# Patient Record
Sex: FEMALE | Race: WHITE | NOT HISPANIC OR LATINO
[De-identification: names, ages, dates, MRNs, and addresses within clinical notes are randomized per-mention and may not be internally consistent; named-entity substitution may affect disease eponyms.]

---

## 2022-02-04 ENCOUNTER — APPOINTMENT (OUTPATIENT)
Dept: COLORECTAL SURGERY | Facility: CLINIC | Age: 47
End: 2022-02-04
Payer: COMMERCIAL

## 2022-02-04 VITALS
HEART RATE: 91 BPM | WEIGHT: 150 LBS | DIASTOLIC BLOOD PRESSURE: 105 MMHG | TEMPERATURE: 97.4 F | BODY MASS INDEX: 25.61 KG/M2 | SYSTOLIC BLOOD PRESSURE: 158 MMHG | HEIGHT: 64 IN | RESPIRATION RATE: 14 BRPM

## 2022-02-04 DIAGNOSIS — Z86.010 PERSONAL HISTORY OF COLONIC POLYPS: ICD-10-CM

## 2022-02-04 DIAGNOSIS — K60.3 ANAL FISTULA: ICD-10-CM

## 2022-02-04 PROCEDURE — 99203 OFFICE O/P NEW LOW 30 MIN: CPT

## 2022-02-04 NOTE — PHYSICAL EXAM
[Respiratory Effort] : normal respiratory effort [Normal Rate and Rhythm] : normal rate and rhythm [No Rash or Lesion] : No rash or lesion [Alert] : alert [Oriented to Person] : oriented to person [Oriented to Place] : oriented to place [Oriented to Time] : oriented to time [Calm] : calm [de-identified] : soft, nondistended [de-identified] : External exam with anterior and posterior sentinel tags.  Fissure 3mm in size noted to anterior midline.  Tenderness with palpation using a cotton-tipped applicator, elicited at anterior and posterior midline, worse anteriorly.  No erythema, fluctuance to indicate abscess, no fistulas noted, no obvious changes concerning for Crohn's.  JOHN and anoscopy deferred secondary to presence of fissure and patient discomfort. [de-identified] : No acute distress [de-identified] : Normocephalic, atraumatic

## 2022-02-04 NOTE — HISTORY OF PRESENT ILLNESS
[FreeTextEntry1] : 46yoF with history of UC diagnosed ~20 years ago, presenting with a chief complaint of perianal pain and lump x2 weeks.  She denies bleeding, fever, chills.  Discomfort is not particularly worsened by bowel movements.  No N/V.  She is applying camphor ointment and alternating warm baths and ice to the area to alleviate discomfort.\par \par She has a somewhat irregular bowel habit that ranges from hard BMs to soft, but she typically has a BM once daily.  No bowel regimen.\par \par She denies and personal or family history of colon cancer.  She does have a brother who also has UC.  She is not currently taking any medications for her UC; she states that she tried both Asacol and 6-MP in the past but she did not feel these had much effect.  She states that she is treating her IBD with dietary adjustments.  Last colonoscopy was in 2019 with a GI in [place]; she reports that there were some findings typical of UC.  She does not know if anything was biopsied.  She also reports a history of anal fistulas several years ago that reportedly coursed to the labia, and reports that she treated these herself with warm baths.

## 2022-03-22 ENCOUNTER — APPOINTMENT (OUTPATIENT)
Dept: COLORECTAL SURGERY | Facility: CLINIC | Age: 47
End: 2022-03-22
Payer: MEDICARE

## 2022-03-22 DIAGNOSIS — K51.00 ULCERATIVE (CHRONIC) PANCOLITIS W/OUT COMPLICATIONS: ICD-10-CM

## 2022-03-22 DIAGNOSIS — K60.0 ACUTE ANAL FISSURE: ICD-10-CM

## 2022-03-22 PROCEDURE — 99213 OFFICE O/P EST LOW 20 MIN: CPT

## 2022-03-22 NOTE — ASSESSMENT
[FreeTextEntry1] : -- Patient's anal fissure displays good healing, but she continues to have some pain posteriorly.  Continue nifedipine ointment.\par -- Continue bowel regimen\par -- Follow up in 6-8 weeks for re-evaluation\par -- Advised to follow-up with GI soon for repeat colonoscopy.  We discussed that annual colonoscopy is recommended with UC because the mucosa does not follow the typical [id number] sequence, and is more unpredictable in its progression.  She will reach out to her usual gastroenterologist.

## 2022-03-22 NOTE — PHYSICAL EXAM
[Respiratory Effort] : normal respiratory effort [Normal Rate and Rhythm] : normal rate and rhythm [No Rash or Lesion] : No rash or lesion [Alert] : alert [Oriented to Person] : oriented to person [Oriented to Place] : oriented to place [Oriented to Time] : oriented to time [Calm] : calm [de-identified] : soft, nondistended [de-identified] : External exam with anterior and posterior sentinel tags.  Anterior anal fissure appears healed. JOHN with tenderness at posterior aspect within anal canal.  Anoscopy attempted but aborted due to pain at posterior aspect. [de-identified] : No acute distress [de-identified] : Normocephalic, atraumatic

## 2022-03-22 NOTE — PROCEDURE
[FreeTextEntry1] : After informed consent was obtained, a lubricated lighted anoscope was partially inserted into the anal canal.  Patient stated significant pain so this was withdrawn.

## 2022-03-22 NOTE — HISTORY OF PRESENT ILLNESS
[FreeTextEntry1] : 3/22/2022: Patient was last seen on 2/4/2022 and was diagnosed with a small anterior anal fissure.  She was treated with nifedipine ointment and a bowel regimen.  She states that she feels significantly better - no further pain.  She has not yet seen GI regarding a follow-up colonoscopy (history of longstanding UC).\par \par 2/4/2022: 46yoF with history of UC diagnosed ~20 years ago, presenting with a chief complaint of perianal pain and lump x2 weeks.  She denies bleeding, fever, chills.  Discomfort is not particularly worsened by bowel movements.  No N/V.  She is applying camphor ointment and alternating warm baths and ice to the area to alleviate discomfort.\par \par She has a somewhat irregular bowel habit that ranges from hard BMs to soft, but she typically has a BM once daily.  No bowel regimen.\par \par She denies and personal or family history of colon cancer.  She does have a brother who also has UC.  She is not currently taking any medications for her UC; she states that she tried both Asacol and 6-MP in the past but she did not feel these had much effect.  She states that she is treating her IBD with dietary adjustments.  Last colonoscopy was in 2019 with a GI in [place]; she reports that there were some findings typical of UC.  She does not know if anything was biopsied.  She also reports a history of anal fistulas several years ago that reportedly coursed to the labia, and reports that she treated these herself with warm baths.

## 2022-07-15 ENCOUNTER — APPOINTMENT (OUTPATIENT)
Dept: COLORECTAL SURGERY | Facility: CLINIC | Age: 47
End: 2022-07-15

## 2022-07-15 VITALS
HEIGHT: 64 IN | TEMPERATURE: 98.1 F | DIASTOLIC BLOOD PRESSURE: 89 MMHG | WEIGHT: 145 LBS | RESPIRATION RATE: 14 BRPM | BODY MASS INDEX: 24.75 KG/M2 | SYSTOLIC BLOOD PRESSURE: 126 MMHG | HEART RATE: 90 BPM | OXYGEN SATURATION: 98 %

## 2022-07-15 DIAGNOSIS — K60.2 ANAL FISSURE, UNSPECIFIED: ICD-10-CM

## 2022-07-15 PROCEDURE — 99213 OFFICE O/P EST LOW 20 MIN: CPT | Mod: 25

## 2022-07-15 PROCEDURE — 46600 DIAGNOSTIC ANOSCOPY SPX: CPT

## 2022-07-15 NOTE — HISTORY OF PRESENT ILLNESS
[FreeTextEntry1] : 7/15/2022: Patient last seen 3/22/2022 with anal fissure, which was healing well at that point.  Today she describes some pain more at the L groin area.  She is concerned that there may be a potential connection to the rectum.  Has some pain with BMs still but much better than before.  Not currently using nifedipine.  Has tried fiber but this makes her stool too firm so she uses a fiber supplement.\par \par 3/22/2022: Patient was last seen on 2/4/2022 and was diagnosed with a small anterior anal fissure.  She was treated with nifedipine ointment and a bowel regimen.  She states that she feels significantly better - no further pain.  She has not yet seen GI regarding a follow-up colonoscopy (history of longstanding UC).\par \par 2/4/2022: 46yoF with history of UC diagnosed ~20 years ago, presenting with a chief complaint of perianal pain and lump x2 weeks.  She denies bleeding, fever, chills.  Discomfort is not particularly worsened by bowel movements.  No N/V.  She is applying camphor ointment and alternating warm baths and ice to the area to alleviate discomfort.\par \par She has a somewhat irregular bowel habit that ranges from hard BMs to soft, but she typically has a BM once daily.  No bowel regimen.\par \par She denies and personal or family history of colon cancer.  She does have a brother who also has UC.  She is not currently taking any medications for her UC; she states that she tried both Asacol and 6-MP in the past but she did not feel these had much effect.  She states that she is treating her IBD with dietary adjustments.  Last colonoscopy was in 2019 with a GI in [place]; she reports that there were some findings typical of UC.  She does not know if anything was biopsied.  She also reports a history of anal fistulas several years ago that reportedly coursed to the labia, and reports that she treated these herself with warm baths.

## 2022-07-15 NOTE — PHYSICAL EXAM
[Respiratory Effort] : normal respiratory effort [Normal Rate and Rhythm] : normal rate and rhythm [Alert] : alert [Oriented to Person] : oriented to person [Oriented to Place] : oriented to place [Oriented to Time] : oriented to time [Calm] : calm [de-identified] : soft, nondistended [de-identified] : External exam with anterior and posterior sentinel tags.  Anal fissure appears well-healed, no tenderness elicited.  JOHN without masses or significant tenderness, no palpable fistula opening; bimanal exam without any abnormalities palpated at rectovaginal septum.  No palpable tracts subcutaneously.   [de-identified] : No acute distress [de-identified] : Normocephalic, atraumatic [de-identified] : At the L adjacent to the labia, in the intertriginous area, there is a 2.5-mm superficial, tender macerated area of skin, which the patient notes is the site of the most discomfort. [de-identified] : Moves all extremities without issues